# Patient Record
Sex: FEMALE | Race: BLACK OR AFRICAN AMERICAN
[De-identification: names, ages, dates, MRNs, and addresses within clinical notes are randomized per-mention and may not be internally consistent; named-entity substitution may affect disease eponyms.]

---

## 2017-04-20 ENCOUNTER — HOSPITAL ENCOUNTER (OUTPATIENT)
Dept: HOSPITAL 62 - RAD | Age: 72
End: 2017-04-20
Attending: UROLOGY
Payer: MEDICARE

## 2017-04-20 DIAGNOSIS — N28.1: Primary | ICD-10-CM

## 2017-04-20 PROCEDURE — 76770 US EXAM ABDO BACK WALL COMP: CPT

## 2018-06-21 ENCOUNTER — HOSPITAL ENCOUNTER (OUTPATIENT)
Dept: HOSPITAL 62 - RAD | Age: 73
End: 2018-06-21
Payer: MEDICARE

## 2018-06-21 DIAGNOSIS — N28.1: Primary | ICD-10-CM

## 2018-06-21 PROCEDURE — 76770 US EXAM ABDO BACK WALL COMP: CPT

## 2018-06-21 NOTE — RADIOLOGY REPORT (SQ)
EXAM DESCRIPTION:  U/S RETROPERITON (RENAL/AORTA)



COMPLETED DATE/TIME:  6/21/2018 1:58 pm



REASON FOR STUDY:  COMPLEX RENAL CYST (N28.1) N28.1  CYST OF KIDNEY, ACQUIRED



COMPARISON:  4/20/2017



TECHNIQUE:  Dynamic and static grayscale images acquired of the kidneys and bladder and recorded on P
ACS. Additional selected color Doppler and spectral images recorded.



LIMITATIONS:  None.



FINDINGS:  RIGHT KIDNEY: Normal size, 8.8 cm. Normal echogenicity. No solid or suspicious masses. No 
hydronephrosis. No calcifications.

LEFT KIDNEY:  Normal size, 11 cm. Normal echogenicity. No solid or suspicious masses.  Complex lower 
pole cyst measures 6.5 x 6.7 x 5.4 cm.  No hydronephrosis. No calcifications.

BLADDER: Urinary jets were not seen.

OTHER FINDINGS: No other significant finding.



IMPRESSION:  Stable slightly complex left lower pole cyst.



TECHNICAL DOCUMENTATION:  JOB ID:  [id number]

 2011 [employer]- All Rights Reserved



Reading location - IP/workstation name: [email or web address]

## 2022-05-25 ENCOUNTER — NEW PATIENT (OUTPATIENT)
Dept: RURAL CLINIC 3 | Facility: CLINIC | Age: 77
End: 2022-05-25

## 2022-05-25 DIAGNOSIS — H25.813: ICD-10-CM

## 2022-05-25 DIAGNOSIS — H02.825: ICD-10-CM

## 2022-05-25 DIAGNOSIS — H52.4: ICD-10-CM

## 2022-05-25 PROCEDURE — 92015 DETERMINE REFRACTIVE STATE: CPT

## 2022-05-25 PROCEDURE — 99204 OFFICE O/P NEW MOD 45 MIN: CPT

## 2022-05-25 ASSESSMENT — VISUAL ACUITY
OD_PH: 20/20-1
OD_SC: 20/40-1
OS_SC: 20/25

## 2022-05-25 ASSESSMENT — TONOMETRY
OS_IOP_MMHG: 18
OD_IOP_MMHG: 18

## 2022-05-25 NOTE — PATIENT DISCUSSION
Discussed diagnosis with patient. Recommend refer to Dr. Jana Graves for evaluation and treatment. Patient defers at this time. Will continue to monitor for changes.